# Patient Record
Sex: FEMALE | Race: WHITE | RURAL
[De-identification: names, ages, dates, MRNs, and addresses within clinical notes are randomized per-mention and may not be internally consistent; named-entity substitution may affect disease eponyms.]

---

## 2017-01-01 ENCOUNTER — CLINICAL SUPPORT (OUTPATIENT)
Dept: FAMILY MEDICINE CLINIC | Age: 72
End: 2017-01-01

## 2017-01-01 ENCOUNTER — TELEPHONE (OUTPATIENT)
Dept: FAMILY MEDICINE CLINIC | Age: 72
End: 2017-01-01

## 2017-01-01 ENCOUNTER — OFFICE VISIT (OUTPATIENT)
Dept: FAMILY MEDICINE CLINIC | Age: 72
End: 2017-01-01

## 2017-01-01 VITALS
WEIGHT: 184 LBS | BODY MASS INDEX: 28.39 KG/M2 | HEART RATE: 64 BPM | OXYGEN SATURATION: 98 % | DIASTOLIC BLOOD PRESSURE: 80 MMHG | RESPIRATION RATE: 18 BRPM | SYSTOLIC BLOOD PRESSURE: 150 MMHG

## 2017-01-01 VITALS
SYSTOLIC BLOOD PRESSURE: 92 MMHG | OXYGEN SATURATION: 95 % | HEART RATE: 80 BPM | WEIGHT: 177.4 LBS | BODY MASS INDEX: 27.37 KG/M2 | RESPIRATION RATE: 16 BRPM | DIASTOLIC BLOOD PRESSURE: 64 MMHG

## 2017-01-01 DIAGNOSIS — I10 ESSENTIAL HYPERTENSION: Primary | ICD-10-CM

## 2017-01-01 DIAGNOSIS — M54.41 CHRONIC RIGHT-SIDED LOW BACK PAIN WITH RIGHT-SIDED SCIATICA: ICD-10-CM

## 2017-01-01 DIAGNOSIS — M53.86 SCIATICA OF LEFT SIDE ASSOCIATED WITH DISORDER OF LUMBAR SPINE: ICD-10-CM

## 2017-01-01 DIAGNOSIS — G89.29 CHRONIC RIGHT-SIDED LOW BACK PAIN WITH RIGHT-SIDED SCIATICA: ICD-10-CM

## 2017-01-01 DIAGNOSIS — Z23 ENCOUNTER FOR IMMUNIZATION: Primary | ICD-10-CM

## 2017-01-01 DIAGNOSIS — E03.9 HYPOTHYROIDISM, ADULT: ICD-10-CM

## 2017-01-01 DIAGNOSIS — I10 ESSENTIAL HYPERTENSION: ICD-10-CM

## 2017-01-01 DIAGNOSIS — M53.86 SCIATICA OF LEFT SIDE ASSOCIATED WITH DISORDER OF LUMBAR SPINE: Primary | ICD-10-CM

## 2017-01-01 LAB
ALBUMIN SERPL-MCNC: 3.8 G/DL (ref 3.5–4.8)
ALBUMIN/GLOB SERPL: 1.6 {RATIO} (ref 1.2–2.2)
ALP SERPL-CCNC: 64 IU/L (ref 39–117)
ALT SERPL-CCNC: 20 IU/L (ref 0–32)
AST SERPL-CCNC: 29 IU/L (ref 0–40)
BASOPHILS # BLD AUTO: 0 X10E3/UL (ref 0–0.2)
BASOPHILS NFR BLD AUTO: 1 %
BILIRUB SERPL-MCNC: 0.4 MG/DL (ref 0–1.2)
BUN SERPL-MCNC: 21 MG/DL (ref 8–27)
BUN/CREAT SERPL: 24 (ref 12–28)
CALCIUM SERPL-MCNC: 9.1 MG/DL (ref 8.7–10.3)
CHLORIDE SERPL-SCNC: 102 MMOL/L (ref 96–106)
CHOLEST SERPL-MCNC: 239 MG/DL (ref 100–199)
CO2 SERPL-SCNC: 25 MMOL/L (ref 18–29)
CREAT SERPL-MCNC: 0.86 MG/DL (ref 0.57–1)
EOSINOPHIL # BLD AUTO: 0.2 X10E3/UL (ref 0–0.4)
EOSINOPHIL NFR BLD AUTO: 4 %
ERYTHROCYTE [DISTWIDTH] IN BLOOD BY AUTOMATED COUNT: 15.2 % (ref 12.3–15.4)
GLOBULIN SER CALC-MCNC: 2.4 G/DL (ref 1.5–4.5)
GLUCOSE SERPL-MCNC: 128 MG/DL (ref 65–99)
HCT VFR BLD AUTO: 38.9 % (ref 34–46.6)
HDLC SERPL-MCNC: 54 MG/DL
HGB BLD-MCNC: 12.4 G/DL (ref 11.1–15.9)
IMM GRANULOCYTES # BLD: 0 X10E3/UL (ref 0–0.1)
IMM GRANULOCYTES NFR BLD: 0 %
LDLC SERPL CALC-MCNC: 157 MG/DL (ref 0–99)
LYMPHOCYTES # BLD AUTO: 1.2 X10E3/UL (ref 0.7–3.1)
LYMPHOCYTES NFR BLD AUTO: 26 %
MCH RBC QN AUTO: 29.1 PG (ref 26.6–33)
MCHC RBC AUTO-ENTMCNC: 31.9 G/DL (ref 31.5–35.7)
MCV RBC AUTO: 91 FL (ref 79–97)
MONOCYTES # BLD AUTO: 0.3 X10E3/UL (ref 0.1–0.9)
MONOCYTES NFR BLD AUTO: 6 %
NEUTROPHILS # BLD AUTO: 3.1 X10E3/UL (ref 1.4–7)
NEUTROPHILS NFR BLD AUTO: 63 %
PLATELET # BLD AUTO: 185 X10E3/UL (ref 150–379)
POTASSIUM SERPL-SCNC: 4.1 MMOL/L (ref 3.5–5.2)
PROT SERPL-MCNC: 6.2 G/DL (ref 6–8.5)
RBC # BLD AUTO: 4.26 X10E6/UL (ref 3.77–5.28)
SODIUM SERPL-SCNC: 140 MMOL/L (ref 134–144)
TRIGL SERPL-MCNC: 138 MG/DL (ref 0–149)
TSH SERPL DL<=0.005 MIU/L-ACNC: 0.99 UIU/ML (ref 0.45–4.5)
VLDLC SERPL CALC-MCNC: 28 MG/DL (ref 5–40)
WBC # BLD AUTO: 4.8 X10E3/UL (ref 3.4–10.8)

## 2017-01-01 RX ORDER — GABAPENTIN 600 MG/1
600 TABLET ORAL 4 TIMES DAILY
Qty: 360 TAB | Refills: 1 | Status: SHIPPED | OUTPATIENT
Start: 2017-01-01

## 2017-01-01 RX ORDER — GABAPENTIN 600 MG/1
600 TABLET ORAL 2 TIMES DAILY
Qty: 180 TAB | Refills: 3 | Status: SHIPPED | OUTPATIENT
Start: 2017-01-01 | End: 2017-01-01 | Stop reason: SDUPTHER

## 2017-01-01 RX ORDER — OXYCODONE HYDROCHLORIDE 5 MG/1
5 TABLET ORAL
COMMUNITY

## 2017-05-02 NOTE — PROGRESS NOTES
Chief Complaint   Patient presents with   [address]     right leg weakness         HPI:       is a 66 y.o. female. Long history of RLE sciatic pain. She has scoliosis and spinal stenosis. Seen by Dr Narendra Paez for 3 FIORELLA but is no better. Neurosurgeon Tarun Mccormick has quoted her a 50% likelihood of improvement with surgery. Shannon Vela has scheduled a second opinion appt with a neurosurgeon at [facility] who specializes in minimally invasive surgery. Pain is primarily in the RLE and she is aware of some RLE weakness. No bowel or bladder incont. Has not been active for 4 months and wants advice on resuming workouts at the gym. Tried deep tissue massage but this was not helpful. Gabapentin 600 mg BID. Remains on Levothyroxine for hypothyroidism; amlodipine for BP. Not experiencing problems with either med. No Known Allergies    Current Outpatient Prescriptions   Medication Sig    amLODIPine (NORVASC) 2.5 mg tablet Take 1 Tab by mouth daily.  clotrimazole-betamethasone (LOTRISONE) topical cream AAA BID until clear    guaiFENesin ER (MUCINEX) 600 mg ER tablet Take 600 mg by mouth two (2) times a day.  gabapentin (NEURONTIN) 600 mg tablet Take 1 Tab by mouth three (3) times daily. Indications: nerve pain (Patient taking differently: Take 600 mg by mouth two (2) times a day.)    Cholecalciferol, Vitamin D3, (VITAMIN D3) 1,000 unit cap Take  by mouth.  levothyroxine (SYNTHROID) 75 mcg tablet Take 1 Tab by mouth Daily (before breakfast).  cetirizine (ZYRTEC) 10 mg tablet Take  by mouth. No current facility-administered medications for this visit.         Past Medical History:   Diagnosis Date    Breast cancer in situ 2002    er/pr+, [id number]+, didn't take tamoxifen b/c of question of post op DVT    Diverticulitis     Hypercholesterolemia     Hypertension     Low serum vitamin D     Osteoarthritis     Thyroid disease          ROS:  Denies fever, chills, cough, chest pain, SOB,  nausea, vomiting, or diarrhea. Denies wt loss, wt gain, hemoptysis, hematochezia or melena. Physical Examination:    /80 (BP 1 Location: Left arm, BP Patient Position: Sitting)  Pulse 64  Resp 18  Wt 184 lb (83.5 kg)  SpO2 98%  BMI 28.39 kg/m2    General: Alert and Ox3, Fluent speech  HEENT:  NC/AT, EOMI, OP: clear  Neck:  Supple, no adenopathy, JVD, mass or bruit  Chest:  Clear to Ausculation, without wheezes, rales, rubs or ronchi  Cardiac: RRR  Abdomen:  +BS, soft, nontender without palpable HSM  Extremities:  No cyanosis, clubbing or edema  Neurologic:  Ambulatory without assist, CN 2-12 grossly intact. Moves all extremities. Skin: no rash  Lymphadenopathy: no cervical or supraclavicular nodes      ASSESSMENT AND PLAN:     1. Spinal stenosis and scoliosis with RLE sciatica: Increase gabapentin to 600 mg TID. Resume water aerobics. May return to [facility] exercise class. RTC in 3 months. 2.  HTN is borderline today  3. Continue Levothyroxine    No orders of the defined types were placed in this encounter.       Jarad Enriquez MD, [address]

## 2017-05-02 NOTE — MR AVS SNAPSHOT
Visit Information Date & Time Provider Department Dept. Phone Encounter #  
 5/2/2017  8:30 AM Antonia Dang MD [address] [address] [id number] Upcoming Health Maintenance Date Due  
 BREAST CANCER SCRN MAMMOGRAM 10/31/1994 FOBT Q 1 YEAR AGE 50-75 10/31/1994 GLAUCOMA SCREENING Q2Y 10/31/2009 DTaP/Tdap/Td series (1 - Tdap) 9/18/2014 INFLUENZA AGE 9 TO ADULT 8/1/2017 MEDICARE YEARLY EXAM 12/14/2017 Allergies as of 5/2/2017  Review Complete On: 5/2/2017 By: Antonia Dang MD  
 No Known Allergies Current Immunizations  Reviewed on 7/8/2015 Name Date Influenza High Dose Vaccine PF 12/13/2016  1:41 PM  
 Influenza Vaccine 10/6/2015 Pneumococcal Conjugate (PCV-13) 11/6/2014 Pneumococcal Polysaccharide (PPSV-23) 1/19/2017 Pneumococcal Vaccine (Unspecified Type) 9/8/2011 Td 9/17/2014 Zoster Vaccine, Live 3/5/2008 Not reviewed this visit Vitals BP Pulse Resp Weight(growth percentile) SpO2 BMI  
 150/80 (BP 1 Location: Left arm, BP Patient Position: Sitting) 64 18 184 lb (83.5 kg) 98% 28.39 kg/m2 Smoking Status Never Smoker BMI and BSA Data Body Mass Index Body Surface Area  
 28.39 kg/m 2 1.99 m 2 Preferred Pharmacy Pharmacy Name Phone [facility] [address] [phone] Your Updated Medication List  
  
   
This list is accurate as of: 5/2/17  9:22 AM.  Always use your most recent med list. amLODIPine 2.5 mg tablet Commonly known as:  Frieda Iqbal Take 1 Tab by mouth daily. clotrimazole-betamethasone topical cream  
Commonly known as:  LOTRISONE  
AAA BID until clear  
  
 gabapentin 600 mg tablet Commonly known as:  NEURONTIN Take 1 Tab by mouth three (3) times daily. Indications: nerve pain  
  
 levothyroxine 75 mcg tablet Commonly known as:  SYNTHROID  
 Take 1 Tab by mouth Daily (before breakfast). MUCINEX 600 mg ER tablet Generic drug:  guaiFENesin ER Take 600 mg by mouth two (2) times a day. VITAMIN D3 1,000 unit Cap Generic drug:  cholecalciferol Take  by mouth. ZyrTEC 10 mg tablet Generic drug:  cetirizine Take  by mouth. Patient Instructions If you have any questions regarding [employer], you may call [employer] support at [phone]. Introducing \[facility] [place] [facility]\"" & HEALTH SERVICES! [facility] introduces [employer] patient portal. Now you can access parts of your medical record, email your doctor's office, and request medication refills online. 1. In your internet browser, go to [email or web address] [email or web address] 2. Click on the First Time User? Click Here link in the Sign In box. You will see the New Member Sign Up page. 3. Enter your [employer] Access Code exactly as it appears below. You will not need to use this code after youve completed the sign-up process. If you do not sign up before the expiration date, you must request a new code. · [employer] Access Code: [email or web address] Expires: 7/31/2017  8:35 AM 
 
4. Enter the last four digits of your Social Security Number (xxxx) and Date of Birth (mm/dd/yyyy) as indicated and click Submit. You will be taken to the next sign-up page. 5. Create a [employer] ID. This will be your [employer] login ID and cannot be changed, so think of one that is secure and easy to remember. 6. Create a [employer] password. You can change your password at any time. 7. Enter your Password Reset Question and Answer. This can be used at a later time if you forget your password. 8. Enter your e-mail address. You will receive e-mail notification when new information is available in [address] 9. Click Sign Up. You can now view and download portions of your medical record. 10. Click the Download Summary menu link to download a portable copy of your medical information. If you have questions, please visit the Frequently Asked Questions section of the [employer] website. Remember, [employer] is NOT to be used for urgent needs. For medical emergencies, dial 911. Now available from your iPhone and Android! Please provide this summary of care documentation to your next provider. Your primary care clinician is listed as Cary Bustillos. If you have any questions after today's visit, please call [phone].

## 2017-05-17 NOTE — TELEPHONE ENCOUNTER
Call from patient, having burning down both legs, even at night, she is frightened, Pain management can not do more injections for 6+months. She saw Dr. Valentina Pereyra in [place] at Dr Mo Malone recommendation and was not impressed, felt he was condescending, she then went to  [facility] and he gave her a 50% chance of improvement. On the recommendation of Kendy Huffman she has an appointment with a Dr. Elizabeth Kaur June 27th, she has called several times to see if she can get in sooner. She did have an MRI in December. Would like Dr. Amilcar Bush to call to see if he can get her moved up. She states she is desperate. .  Dr. Elizabeth Kaur  [phone]

## 2017-05-17 NOTE — TELEPHONE ENCOUNTER
Spoke with patient again, she was able to speak with Dr. Marcial Mcdonald nurse and has an appointment this Friday.

## 2017-08-04 NOTE — PROGRESS NOTES
Chief Complaint   Patient presents with    LOW BACK PAIN         HPI:       is a 66 y.o. female. Spine surgery to lower back in late May. Better but still has RLE sciatica. Sitting is quite difficult. Still taking Oxycodone TID. Surgeon has signed off on her case. Still in PT.  TENS has helped. Walking for exercise. Occasionally dizzy. Amlodipine 2.5 mg daily    No Known Allergies    Current Outpatient Prescriptions   Medication Sig    oxyCODONE IR (ROXICODONE) 5 mg immediate release tablet Take 5 mg by mouth every eight (8) hours as needed for Pain.  gabapentin (NEURONTIN) 600 mg tablet Take 1 Tab by mouth two (2) times a day. (Patient taking differently: Take 600 mg by mouth nightly.)    levothyroxine (SYNTHROID) 75 mcg tablet Take 1 Tab by mouth Daily (before breakfast).  amLODIPine (NORVASC) 2.5 mg tablet Take 1 Tab by mouth daily.  clotrimazole-betamethasone (LOTRISONE) topical cream AAA BID until clear    guaiFENesin ER (MUCINEX) 600 mg ER tablet Take 600 mg by mouth two (2) times a day. No current facility-administered medications for this visit. Past Medical History:   Diagnosis Date    Breast cancer in situ 2002    er/pr+, [id number]+, didn't take tamoxifen b/c of question of post op DVT    Diverticulitis     Hypercholesterolemia     Hypertension     Low serum vitamin D     Osteoarthritis     Thyroid disease          ROS:  Denies fever, chills, cough, chest pain, SOB,  nausea, vomiting, or diarrhea. Denies wt loss, wt gain, hemoptysis, hematochezia or melena.     Physical Examination:    BP 92/64 (BP 1 Location: Right arm, BP Patient Position: Standing)  Pulse 80  Resp 16  Wt 177 lb 6.4 oz (80.5 kg)  SpO2 95%  BMI 27.37 kg/m2    General: Alert and Ox3, Fluent speech  HEENT:  NC/AT, EOMI, OP: clear  Neck:  Supple, no adenopathy, JVD, mass or bruit  Chest:  Clear to Ausculation, without wheezes, rales, rubs or ronchi  Cardiac: RRR  Abdomen:  +BS, soft, nontender without palpable HSM  Extremities:  No cyanosis, clubbing or edema  Neurologic:  Ambulatory without assist, CN 2-12 grossly intact. Moves all extremities. Skin: no rash  Lymphadenopathy: no cervical or supraclavicular nodes      ASSESSMENT AND PLAN:     1. Dizzy:  Stop Amlodipine  2. LBP:  Still too early to say how well she will be following surgery. Needs to wean off opiates. Source a TENS unit and continue with PT.  3.  RTC in 6 months. Orders Placed This Encounter    oxyCODONE IR (ROXICODONE) 5 mg immediate release tablet     Sig: Take 5 mg by mouth every eight (8) hours as needed for Pain.        Laure Beach MD, [address]

## 2017-08-04 NOTE — MR AVS SNAPSHOT
Visit Information Date & Time Provider Department Dept. Phone Encounter #  
 8/4/2017  8:30 AM Perez Cummings MD [place] [place] [address] [id number] Upcoming Health Maintenance Date Due FOBT Q 1 YEAR AGE 50-75 10/31/1994 GLAUCOMA SCREENING Q2Y 10/31/2009 DTaP/Tdap/Td series (1 - Tdap) 9/18/2014 INFLUENZA AGE 9 TO ADULT 8/1/2017 MEDICARE YEARLY EXAM 12/14/2017 BREAST CANCER SCRN MAMMOGRAM 5/16/2019 Allergies as of 8/4/2017  Review Complete On: 8/4/2017 By: Perez Cummings MD  
 No Known Allergies Current Immunizations  Reviewed on 7/8/2015 Name Date Influenza High Dose Vaccine PF 12/13/2016  1:41 PM  
 Influenza Vaccine 10/6/2015 Pneumococcal Conjugate (PCV-13) 11/6/2014 Pneumococcal Polysaccharide (PPSV-23) 1/19/2017 Pneumococcal Vaccine (Unspecified Type) 9/8/2011 Td 9/17/2014 Zoster Vaccine, Live 3/5/2008 Not reviewed this visit Vitals BP Pulse Resp Weight(growth percentile) SpO2 BMI  
 92/64 (BP 1 Location: Right arm, BP Patient Position: Standing) 80 16 177 lb 6.4 oz (80.5 kg) 95% 27.37 kg/m2 Smoking Status Never Smoker BMI and BSA Data Body Mass Index Body Surface Area  
 27.37 kg/m 2 1.96 m 2 Preferred Pharmacy Pharmacy Name Phone [address] [phone] Your Updated Medication List  
  
   
This list is accurate as of: 8/4/17  9:33 AM.  Always use your most recent med list. amLODIPine 2.5 mg tablet Commonly known as:  Caralee Dupes Take 1 Tab by mouth daily. clotrimazole-betamethasone topical cream  
Commonly known as:  LOTRISONE  
AAA BID until clear  
  
 gabapentin 600 mg tablet Commonly known as:  NEURONTIN Take 1 Tab by mouth two (2) times a day. levothyroxine 75 mcg tablet Commonly known as:  SYNTHROID  
 Take 1 Tab by mouth Daily (before breakfast). MUCINEX 600 mg ER tablet Generic drug:  guaiFENesin ER Take 600 mg by mouth two (2) times a day. oxyCODONE IR 5 mg immediate release tablet Commonly known as:  Manolo Robison Take 5 mg by mouth every eight (8) hours as needed for Pain. Patient Instructions If you have any questions regarding [employer], you may call [employer] support at [phone]. Introducing [facility] SERVICES! Trav Dela Cruz introduces [facility] patient portal. Now you can access parts of your medical record, email your doctor's office, and request medication refills online. 1. In your internet browser, go to [email or web address] 2. Click on the First Time User? Click Here link in the Sign In box. You will see the New Member Sign Up page. 3. Enter your [facility] Access Code exactly as it appears below. You will not need to use this code after youve completed the sign-up process. If you do not sign up before the expiration date, you must request a new code. · [facility] Access Code: [email or web address] Expires: 11/2/2017  8:31 AM 
 
4. Enter the last four digits of your Social Security Number (xxxx) and Date of Birth (mm/dd/yyyy) as indicated and click Submit. You will be taken to the next sign-up page. 5. Create a [facility] ID. This will be your [facility] login ID and cannot be changed, so think of one that is secure and easy to remember. 6. Create a [facility] password. You can change your password at any time. 7. Enter your Password Reset Question and Answer. This can be used at a later time if you forget your password. 8. Enter your e-mail address. You will receive e-mail notification when new information is available in [address] 9. Click Sign Up. You can now view and download portions of your medical record. 10. Click the Download Summary menu link to download a portable copy of your medical information. If you have questions, please visit the Frequently Asked Questions section of the [email or web address] website. Remember, [employer] is NOT to be used for urgent needs. For medical emergencies, dial 911. Now available from your iPhone and Android! Please provide this summary of care documentation to your next provider. Your primary care clinician is listed as Ashley Lindquist. If you have any questions after today's visit, please call [phone].

## 2017-08-04 NOTE — PATIENT INSTRUCTIONS
If you have any questions regarding [employer], you may call [employer] support at [phone].

## 2017-08-22 NOTE — TELEPHONE ENCOUNTER
[phone] contact #, myah Macy-missed your call. She is available and was up stairs stretching and forgot her phone, would like acupuncture and would like to know if it's wise to try it? Please check with Oswaldo Olmedo to find  out this information for me, please.

## 2017-08-22 NOTE — TELEPHONE ENCOUNTER
Told her Dr. Kindra Bruno agreed she could try Acupuncture, she also had decreased her Gabapentin to once a day, back trouble started, nerve pain, Tylenol doesn't touch it. Can't even sit thru Gnosticism. Still takes Advil and Tylenol, sits on a gel back. Making progress, back to Gabapentin 600mg TID. Will try Dr. No Grossman first and let us know.

## 2017-09-21 NOTE — TELEPHONE ENCOUNTER
[phone] contact # per Roxanna Beauchamp, please call pertaining to her pills in which you two discussed previously. Please call as I can give you all the information and we can clear this matter up.   Thanks Paty Higgins,  [phone] telephone #

## 2017-09-21 NOTE — TELEPHONE ENCOUNTER
4 months since surgery, can only sit for about 10 min, can't sit thru Mormonism. Would increasing Gabapentin be beneficial, currently taking them TID. Had Acupuncture x13 sessions and PT prior to acupuncture and that was not helpful at all, Has used a TENS unit as well. Minimal improvement. What else can she do. Really upset as her activity is so limited even tho she is trying to walk.  Will need new script if increase dose or frequency

## 2017-10-09 NOTE — MR AVS SNAPSHOT
Visit Information Date & Time Provider Department Dept. Phone Encounter #  
 10/9/2017  1:25 PM Carol Love [id number] Your Appointments 2/6/2018  9:00 AM  
ESTABLISHED PATIENT with Viki Gallardo MD  
[place] [address] ([address]) Appt Note: 6 mo f/u  
 [address] [facility] [address] [address] [phone]  
  
   
 [address] [facility] [address] [facility] [address] Upcoming Health Maintenance Date Due FOBT Q 1 YEAR AGE 50-75 10/31/1994 GLAUCOMA SCREENING Q2Y 10/31/2009 DTaP/Tdap/Td series (1 - Tdap) 9/18/2014 INFLUENZA AGE 9 TO ADULT 8/1/2017 MEDICARE YEARLY EXAM 12/14/2017 BREAST CANCER SCRN MAMMOGRAM 5/16/2019 Allergies as of 10/9/2017  Review Complete On: 8/4/2017 By: Viki Gallardo MD  
 No Known Allergies Current Immunizations  Reviewed on 7/8/2015 Name Date Influenza High Dose Vaccine PF 12/13/2016  1:41 PM  
 Influenza Vaccine 10/6/2015 Pneumococcal Conjugate (PCV-13) 11/6/2014 Pneumococcal Polysaccharide (PPSV-23) 1/19/2017 Pneumococcal Vaccine (Unspecified Type) 9/8/2011 Td 9/17/2014 Zoster Vaccine, Live 3/5/2008 Not reviewed this visit Vitals Smoking Status Never Smoker Preferred Pharmacy Pharmacy Name Phone [address] [phone] Your Updated Medication List  
  
   
This list is accurate as of: 10/9/17  1:36 PM.  Always use your most recent med list. amLODIPine 2.5 mg tablet Commonly known as:  [place] Take 1 Tab by mouth daily. clotrimazole-betamethasone topical cream  
Commonly known as:  LOTRISONE  
AAA BID until clear  
  
 gabapentin 600 mg tablet Commonly known as:  NEURONTIN Take 1 Tab by mouth four (4) times daily. levothyroxine 75 mcg tablet Commonly known as:  SYNTHROID  
 Take 1 Tab by mouth Daily (before breakfast). MUCINEX 600 mg ER tablet Generic drug:  guaiFENesin ER Take 600 mg by mouth two (2) times a day. oxyCODONE IR 5 mg immediate release tablet Commonly known as:  Elridge Ditto Take 5 mg by mouth every eight (8) hours as needed for Pain. Introducing [address]! Louann Romero introduces [employer] patient portal. Now you can access parts of your medical record, email your doctor's office, and request medication refills online. 1. In your internet browser, go to [email or web address] 2. Click on the First Time User? Click Here link in the Sign In box. You will see the New Member Sign Up page. 3. Enter your [employer]e Access Code exactly as it appears below. You will not need to use this code after youve completed the sign-up process. If you do not sign up before the expiration date, you must request a new code. · [employer] Access Code: [email or web address] Expires: 11/2/2017  8:31 AM 
 
4. Enter the last four digits of your Social Security Number (xxxx) and Date of Birth (mm/dd/yyyy) as indicated and click Submit. You will be taken to the next sign-up page. 5. Create a Meme ID. This will be your Meme login ID and cannot be changed, so think of one that is secure and easy to remember. 6. Create a Meme password. You can change your password at any time. 7. Enter your Password Reset Question and Answer. This can be used at a later time if you forget your password. 8. Enter your e-mail address. You will receive e-mail notification when new information is available in [address] 9. Click Sign Up. You can now view and download portions of your medical record. 10. Click the Download Summary menu link to download a portable copy of your medical information. If you have questions, please visit the Frequently Asked Questions section of the Meme website.  Remember, Meme is NOT to be used for urgent needs. For medical emergencies, dial 911. Now available from your iPhone and Android! Please provide this summary of care documentation to your next provider. Your primary care clinician is listed as Oswald Grave. If you have any questions after today's visit, please call [phone].

## 2017-10-27 NOTE — TELEPHONE ENCOUNTER
----- Message from Georgina Streeter sent at 10/27/2017  8:08 AM EDT -----  Regarding: Dr. Eveline Peng / Telephone  Pt would like to be seen soon as possible to follow up on back surgery. Pt would also like to know if she needs to be seen by a pain management doctor.  (P)[phone]

## 2017-10-30 NOTE — TELEPHONE ENCOUNTER
----- Message from Luciano Wild sent at 10/30/2017 10:21 AM EDT -----  Regarding: Dr Covington/telephone  Pt (p) [phone], pt is un scheduling her request to be seen in 2 weeks,, and wants to wait for her 6 month to 12 month  after her back  surgery to see improvement , she will wait and see, how she does after her back surgery she had in May. But will Keep her appt that is scheduled in Feb 6th.

## 2017-11-27 NOTE — TELEPHONE ENCOUNTER
----- Message from Kip Lindsey sent at 11/27/2017  1:52 PM EST -----  Regarding: Dr Covington/telephone  Pt's p) [phone], pt would like to know if she can be scheduled in sometime this week for her 6 month f/up after her back surgery, and said her Psoriasis  has gotten worse with some burning. Except for Wednesday from 10:30 - 12:30pm does not work due to her schedule.

## 2017-11-28 NOTE — TELEPHONE ENCOUNTER
Spoke with patient,states her PT was horrible after her back surgery. Now has had massage and seen a chiropractor and feeling much better. Current problem is the flare up of psoriasis, low back around incision and ankles.  Scheduled

## 2023-01-20 NOTE — TELEPHONE ENCOUNTER
[facility] Office                     Pt requested an appt for Friday to discuss back surgery since her last visit.  Best contact number [phone].        Message from [address]
Unsure about back surgery option, has been doing exercises. Was told by surgeon to see her PCP.
Past 24 hrs